# Patient Record
Sex: FEMALE | Race: OTHER | HISPANIC OR LATINO | URBAN - METROPOLITAN AREA
[De-identification: names, ages, dates, MRNs, and addresses within clinical notes are randomized per-mention and may not be internally consistent; named-entity substitution may affect disease eponyms.]

---

## 2017-11-08 ENCOUNTER — APPOINTMENT (RX ONLY)
Dept: URBAN - METROPOLITAN AREA CLINIC 15 | Facility: CLINIC | Age: 47
Setting detail: DERMATOLOGY
End: 2017-11-08

## 2017-11-08 DIAGNOSIS — Z41.9 ENCOUNTER FOR PROCEDURE FOR PURPOSES OTHER THAN REMEDYING HEALTH STATE, UNSPECIFIED: ICD-10-CM

## 2017-11-08 PROCEDURE — ? FILLERS

## 2017-11-08 PROCEDURE — ? BOTOX

## 2017-11-08 PROCEDURE — ? ADDITIONAL NOTES

## 2017-11-08 ASSESSMENT — LOCATION DETAILED DESCRIPTION DERM
LOCATION DETAILED: LEFT LATERAL FOREHEAD
LOCATION DETAILED: LEFT MEDIAL FOREHEAD
LOCATION DETAILED: RIGHT LATERAL MALAR CHEEK
LOCATION DETAILED: RIGHT FOREHEAD
LOCATION DETAILED: LEFT SUPERIOR MEDIAL FOREHEAD
LOCATION DETAILED: LEFT INFERIOR VERMILION BORDER
LOCATION DETAILED: LEFT LATERAL MALAR CHEEK
LOCATION DETAILED: LEFT SUPERIOR CENTRAL MALAR CHEEK
LOCATION DETAILED: LEFT NASOLABIAL FOLD
LOCATION DETAILED: RIGHT LOWER CUTANEOUS LIP
LOCATION DETAILED: LEFT CENTRAL EYEBROW
LOCATION DETAILED: RIGHT INFERIOR LATERAL FOREHEAD
LOCATION DETAILED: LEFT UPPER CUTANEOUS LIP
LOCATION DETAILED: GLABELLA
LOCATION DETAILED: LEFT FOREHEAD
LOCATION DETAILED: LEFT INFERIOR TEMPLE
LOCATION DETAILED: LEFT MID TEMPLE
LOCATION DETAILED: RIGHT UPPER CUTANEOUS LIP
LOCATION DETAILED: RIGHT SUPERIOR CENTRAL MALAR CHEEK
LOCATION DETAILED: RIGHT SUPERIOR FOREHEAD
LOCATION DETAILED: LEFT SUPERIOR LATERAL FOREHEAD
LOCATION DETAILED: RIGHT LATERAL FOREHEAD
LOCATION DETAILED: RIGHT INFERIOR MEDIAL MALAR CHEEK
LOCATION DETAILED: RIGHT ORAL COMMISSURE
LOCATION DETAILED: RIGHT INFERIOR FOREHEAD
LOCATION DETAILED: RIGHT INFERIOR TEMPLE
LOCATION DETAILED: RIGHT SUPERIOR LATERAL MALAR CHEEK
LOCATION DETAILED: LEFT INFERIOR LATERAL FOREHEAD
LOCATION DETAILED: LEFT ORAL COMMISSURE

## 2017-11-08 ASSESSMENT — LOCATION SIMPLE DESCRIPTION DERM
LOCATION SIMPLE: LEFT LOWER LIP
LOCATION SIMPLE: RIGHT FOREHEAD
LOCATION SIMPLE: GLABELLA
LOCATION SIMPLE: LEFT LIP
LOCATION SIMPLE: LEFT CHEEK
LOCATION SIMPLE: LEFT EYEBROW
LOCATION SIMPLE: RIGHT LIP
LOCATION SIMPLE: LEFT FOREHEAD
LOCATION SIMPLE: RIGHT TEMPLE
LOCATION SIMPLE: LEFT TEMPLE
LOCATION SIMPLE: RIGHT CHEEK

## 2017-11-08 ASSESSMENT — LOCATION ZONE DERM
LOCATION ZONE: FACE
LOCATION ZONE: LIP

## 2017-11-08 NOTE — HPI: COSMETIC (BOTOX)
Have You Had Botox Before?: has had botox
Additional History: 3 areas $943
When Was Your Last Botox Treatment?: 2015

## 2017-11-08 NOTE — HPI: COSMETIC (FILLERS)
Have You Had Fillers Before?: has had fillers
Additional History: 1 voluma $1000\\n1 juvederm ultra $600
When Was Your Last Filler Injection?: 2015

## 2017-11-08 NOTE — PROCEDURE: BOTOX
Post-Care Instructions: Patient instructed to not lie down for 4 hours and limit physical activity for 24 hours. Patient instructed not to travel by airplane for 48 hours.
Additional Area 1 Location: chin and corner of mouth
Additional Area 1 Units: 0
Forehead Units: [address] [facility]
Dilution (U/0.1 Cc): 2.5
Detail Level: Simple
Periorbital Skin Units: 21
Consent: Written consent obtained. Risks include but not limited to lid/brow ptosis, bruising, swelling, diplopia, temporary effect, incomplete chemical denervation.
Glabellar Complex Units: 10
Expiration Date (Month Year): 4/20
Lot #: [id number]

## 2017-11-08 NOTE — PROCEDURE: FILLERS
Expiration Date (Month Year): 2018/09/19
Nasolabial Folds Filler Volume In Cc: 0
Lot #: [id number]
Lot #: [id number]
Anesthesia Volume In Cc: 0.5
Expiration Date (Month Year): 2019/01/22
Map Statment: See Attached Map for Complete Details
Filler: Juvederm Voluma XC
Nasolabial Folds Filler Volume In Cc: 1
Lot #: [id number]
Detail Level: Simple
Filler: Juvederm Ultra
Lot #: [id number]
Consent: Written consent obtained. Risks include but not limited to bruising, beading, irregular texture, ulceration, infection, allergic reaction, scar formation, incomplete augmentation, temporary nature, procedural pain.
Additional Area 1 Location: corners of mouth
Expiration Date (Month Year): 2018/11/23
Use Map Statement For Sites (Optional): No
Additional Area 1 Location: corners of the mouth and lips
Post-Care Instructions: Patient instructed to apply ice to reduce swelling.
Additional Anesthesia Volume In Cc: 6

## 2019-04-16 ENCOUNTER — APPOINTMENT (RX ONLY)
Dept: URBAN - METROPOLITAN AREA CLINIC 15 | Facility: CLINIC | Age: 49
Setting detail: DERMATOLOGY
End: 2019-04-16

## 2019-04-16 DIAGNOSIS — Z41.9 ENCOUNTER FOR PROCEDURE FOR PURPOSES OTHER THAN REMEDYING HEALTH STATE, UNSPECIFIED: ICD-10-CM

## 2019-04-16 PROCEDURE — ? BOTOX

## 2019-04-16 PROCEDURE — ? ADDITIONAL NOTES

## 2019-04-16 PROCEDURE — ? JUVEDERM ULTRA INJECTION

## 2019-04-16 PROCEDURE — ? COSMETIC CONSULTATION: FILLERS

## 2019-04-16 ASSESSMENT — LOCATION DETAILED DESCRIPTION DERM
LOCATION DETAILED: RIGHT MEDIAL BUCCAL CHEEK
LOCATION DETAILED: RIGHT NASOLABIAL FOLD
LOCATION DETAILED: LEFT INFERIOR VERMILION LIP
LOCATION DETAILED: LEFT MEDIAL BUCCAL CHEEK
LOCATION DETAILED: LEFT CENTRAL BUCCAL CHEEK
LOCATION DETAILED: RIGHT INFERIOR FOREHEAD
LOCATION DETAILED: LEFT INFERIOR LATERAL FOREHEAD
LOCATION DETAILED: LEFT UPPER CUTANEOUS LIP
LOCATION DETAILED: LEFT INFERIOR TEMPLE
LOCATION DETAILED: LEFT SUPERIOR FOREHEAD
LOCATION DETAILED: RIGHT LATERAL EYEBROW
LOCATION DETAILED: RIGHT INFERIOR CENTRAL MALAR CHEEK
LOCATION DETAILED: GLABELLA
LOCATION DETAILED: RIGHT INFERIOR VERMILION LIP
LOCATION DETAILED: RIGHT SUPERIOR LATERAL MALAR CHEEK
LOCATION DETAILED: SUPERIOR MID FOREHEAD
LOCATION DETAILED: LEFT INFERIOR MEDIAL FOREHEAD
LOCATION DETAILED: LEFT MID TEMPLE
LOCATION DETAILED: RIGHT INFERIOR TEMPLE
LOCATION DETAILED: RIGHT INFERIOR LATERAL FOREHEAD
LOCATION DETAILED: RIGHT UPPER CUTANEOUS LIP
LOCATION DETAILED: RIGHT FOREHEAD
LOCATION DETAILED: LEFT FOREHEAD
LOCATION DETAILED: RIGHT SUPERIOR FOREHEAD
LOCATION DETAILED: LEFT SUPERIOR CENTRAL MALAR CHEEK
LOCATION DETAILED: INFERIOR MID FOREHEAD
LOCATION DETAILED: RIGHT SUPERIOR CENTRAL MALAR CHEEK
LOCATION DETAILED: RIGHT SUPERIOR MEDIAL BUCCAL CHEEK
LOCATION DETAILED: RIGHT CENTRAL BUCCAL CHEEK
LOCATION DETAILED: LEFT CENTRAL MALAR CHEEK
LOCATION DETAILED: RIGHT LATERAL BUCCAL CHEEK
LOCATION DETAILED: NASAL DORSUM

## 2019-04-16 ASSESSMENT — LOCATION SIMPLE DESCRIPTION DERM
LOCATION SIMPLE: RIGHT EYEBROW
LOCATION SIMPLE: LEFT FOREHEAD
LOCATION SIMPLE: LEFT TEMPLE
LOCATION SIMPLE: RIGHT CHEEK
LOCATION SIMPLE: INFERIOR FOREHEAD
LOCATION SIMPLE: RIGHT TEMPLE
LOCATION SIMPLE: SUPERIOR FOREHEAD
LOCATION SIMPLE: GLABELLA
LOCATION SIMPLE: RIGHT LIP
LOCATION SIMPLE: LEFT LIP
LOCATION SIMPLE: LEFT CHEEK
LOCATION SIMPLE: NOSE
LOCATION SIMPLE: RIGHT CHEEK
LOCATION SIMPLE: LEFT CHEEK
LOCATION SIMPLE: RIGHT FOREHEAD

## 2019-04-16 ASSESSMENT — LOCATION ZONE DERM
LOCATION ZONE: FACE
LOCATION ZONE: LIP
LOCATION ZONE: FACE
LOCATION ZONE: NOSE

## 2019-04-16 NOTE — PROCEDURE: ADDITIONAL NOTES
Additional Notes: 3 Areas of Botox $680
Detail Level: Simple
Additional Notes: [employer] Ultra 1 syringe $600
Detail Level: Zone

## 2019-04-16 NOTE — PROCEDURE: BOTOX
Lot #: [id number]
Lateral Platysmal Bands Units: 0
Detail Level: Zone
Expiration Date (Month Year): 08/2021
Glabellar Complex Units: 15
Post-Care Instructions: Patient instructed to not lie down for 4 hours and limit physical activity for 24 hours. Patient instructed not to travel by airplane for 48 hours.
Forehead Units: [address] [facility]
Additional Area 1 Location: Bunny lines
Additional Area 1 Units: 7.5
Consent: Written consent obtained. Risks include but not limited to lid/brow ptosis, bruising, swelling, diplopia, temporary effect, incomplete chemical denervation.
Dilution (U/0.1 Cc): 4
Additional Area 4 Location: nasal tip lifting injection :2.5 Units.

## 2019-04-16 NOTE — PROCEDURE: JUVEDERM ULTRA INJECTION
Cheeks Filler Volume In Cc: 0
Include Cannula Information In Note?: No
Additional Area 2 Location: [place]
Post-Care Instructions: Patient instructed to apply ice to reduce swelling.
Topical Anesthesia?: BLT cream (benzocaine 20%, lidocaine 6%, tetracaine 4%)
Expiration Date (Month Year): 2020-02-26
Map Statment: See [address] for Complete Details
Nasolabial Folds Filler Volume In Cc: 0.7
Filler: Juvederm Ultra
Detail Level: Simple
Additional Area 1 Location: oral commissure
Use Map Statement For Sites (Optional): Yes
Consent: Written consent obtained. Risks include but not limited to bruising, beading, irregular texture, ulceration, infection, allergic reaction, scar formation, incomplete augmentation, temporary nature, procedural pain.
Procedural Text: The filler was administered to the treatment areas noted above.
Lot #: [id number]
Marionette Lines Filler  Volume In Cc: 0.3

## 2019-12-26 ENCOUNTER — APPOINTMENT (RX ONLY)
Dept: URBAN - METROPOLITAN AREA CLINIC 15 | Facility: CLINIC | Age: 49
Setting detail: DERMATOLOGY
End: 2019-12-26

## 2019-12-26 DIAGNOSIS — Z41.9 ENCOUNTER FOR PROCEDURE FOR PURPOSES OTHER THAN REMEDYING HEALTH STATE, UNSPECIFIED: ICD-10-CM

## 2019-12-26 PROCEDURE — ? BOTOX

## 2019-12-26 PROCEDURE — ? ADDITIONAL NOTES

## 2019-12-26 PROCEDURE — ? JUVEDERM ULTRA INJECTION

## 2019-12-26 PROCEDURE — ? RESTYLANE LYFT INJECTION

## 2019-12-26 NOTE — PROCEDURE: BOTOX
Show Right And Left Brow Units: No
Show Depressor Anguli Units: Yes
Right Periorbital Units: 0
Additional Area 1 Location: upper lip
Dilution (U/0.1 Cc): 1
Forehead Units: 10
Additional Area 3 Location: chin
Consent: Written consent obtained. Risks include but not limited to lid/brow ptosis, bruising, swelling, diplopia, temporary effect, incomplete chemical denervation.
Glabellar Complex Units: [address] [facility]
Detail Level: Detailed
Additional Area 2 Location: bunny lines
Lot #: [id number]
Expiration Date (Month Year): 06/2022
Post-Care Instructions: Patient instructed to not lie down for 4 hours and limit physical activity for 24 hours. Patient instructed not to travel by airplane for 48 hours.
Periorbital Skin Units: [address] [facility]

## 2019-12-26 NOTE — PROCEDURE: RESTYLANE LYFT INJECTION
Temple Hollows Filler  Volume In Cc: 0
Number Of Syringes (Required For Inventory): 2
Lot #: [id number] [facility]
Anesthesia Volume In Cc: 0.5
Map Statement: See [address] for Complete Details
Additional Area 2 Location: nose
Procedural Text: The filler was administered to the treatment areas noted above.
Consent: Written consent obtained. Risks include but not limited to bruising, beading, irregular texture, ulceration, infection, allergic reaction, scar formation, incomplete augmentation, temporary nature, procedural pain.
Include Cannula Information In Note?: No
Detail Level: Detailed
Expiration Date (Month Year): 2022-03-31
Filler: Jeremy Bergeron
Post-Care Instructions: Patient instructed to apply ice to reduce swelling.
Anesthesia Type: 1% lidocaine with epinephrine
Additional Anesthesia Volume In Cc: 6

## 2019-12-26 NOTE — PROCEDURE: ADDITIONAL NOTES
Detail Level: Simple
Additional Notes: 2 Restylane Lyft $700 each\\n1 juvederm Ultra $600
Additional Notes: Botox 3 areas $680

## 2019-12-26 NOTE — PROCEDURE: JUVEDERM ULTRA INJECTION
Dorsal Hands Filler  Volume In Cc: 0
Filler: Juvederm Ultra
Post-Care Instructions: Patient instructed to apply ice to reduce swelling.
Expiration Date (Month Year): 2020-08-07
Nasolabial Folds Filler Volume In Cc: 1
Include Cannula Length?: 1.5 inch
Detail Level: Detailed
Use Map Statement For Sites (Optional): No
Additional Area 1 Location: lips
Additional Anesthesia Volume In Cc: 6
Lot #: [id number]
Anesthesia Volume In Cc: 0.5
Procedural Text: The filler was administered to the treatment areas noted above.
Consent: Written consent obtained. Risks include but not limited to bruising, beading, irregular texture, ulceration, infection, allergic reaction, scar formation, incomplete augmentation, temporary nature, procedural pain.
Map Statment: See [address] for Complete Details
Include Cannula Information In Note?: Yes
Include Cannula Size?: 25G

## 2020-01-09 ENCOUNTER — APPOINTMENT (RX ONLY)
Dept: URBAN - METROPOLITAN AREA CLINIC 15 | Facility: CLINIC | Age: 50
Setting detail: DERMATOLOGY
End: 2020-01-09

## 2020-01-09 DIAGNOSIS — Z41.9 ENCOUNTER FOR PROCEDURE FOR PURPOSES OTHER THAN REMEDYING HEALTH STATE, UNSPECIFIED: ICD-10-CM

## 2020-01-09 PROCEDURE — ? ADDITIONAL NOTES

## 2020-01-09 PROCEDURE — ? COSMETIC FOLLOW-UP

## 2020-01-09 NOTE — PROCEDURE: ADDITIONAL NOTES
Detail Level: Simple
Additional Notes: We discussed with patient threads $400 each thread at least 4

## 2021-09-10 ENCOUNTER — APPOINTMENT (RX ONLY)
Dept: URBAN - METROPOLITAN AREA CLINIC 15 | Facility: CLINIC | Age: 51
Setting detail: DERMATOLOGY
End: 2021-09-10

## 2021-09-10 DIAGNOSIS — Z41.9 ENCOUNTER FOR PROCEDURE FOR PURPOSES OTHER THAN REMEDYING HEALTH STATE, UNSPECIFIED: ICD-10-CM

## 2021-09-10 PROCEDURE — ? COSMETIC CONSULTATION: FILLERS

## 2021-09-10 PROCEDURE — ? TEOSYAL RHA 2 INJECTION

## 2021-09-10 PROCEDURE — ? MEDICAL CONSULTATION: DYNAMIC RHYTIDES

## 2021-09-10 PROCEDURE — ? RESTYLANE INJECTION

## 2021-09-10 PROCEDURE — ? RESTYLANE LYFT INJECTION

## 2021-09-10 PROCEDURE — ? JUVEDERM ULTRA PLUS INJECTION

## 2021-09-10 ASSESSMENT — LOCATION DETAILED DESCRIPTION DERM
LOCATION DETAILED: RIGHT SUPERIOR MEDIAL MALAR CHEEK
LOCATION DETAILED: RIGHT CENTRAL MALAR CHEEK
LOCATION DETAILED: LEFT CENTRAL MALAR CHEEK
LOCATION DETAILED: LEFT UPPER CUTANEOUS LIP
LOCATION DETAILED: RIGHT INFERIOR MEDIAL MALAR CHEEK
LOCATION DETAILED: RIGHT MID TEMPLE
LOCATION DETAILED: LEFT LATERAL INFERIOR EYELID
LOCATION DETAILED: LEFT MID TEMPLE

## 2021-09-10 ASSESSMENT — LOCATION SIMPLE DESCRIPTION DERM
LOCATION SIMPLE: RIGHT TEMPLE
LOCATION SIMPLE: RIGHT CHEEK
LOCATION SIMPLE: LEFT INFERIOR EYELID
LOCATION SIMPLE: LEFT TEMPLE
LOCATION SIMPLE: LEFT LIP
LOCATION SIMPLE: LEFT CHEEK

## 2021-09-10 ASSESSMENT — LOCATION ZONE DERM
LOCATION ZONE: LIP
LOCATION ZONE: FACE
LOCATION ZONE: EYELID

## 2021-09-10 NOTE — PROCEDURE: RESTYLANE INJECTION
[place]erm[place] Lips Filler Volume In Cc: 0
Additional Anesthesia Volume In Cc: 6
Additional Area 1 Location: chin
Procedural Text: The filler was administered to the treatment areas noted above.
Price (Use Numbers Only, No Special Characters Or $): 1800
Filler: Restylane Contour
Map Statement: See [address] for Complete Details
Anesthesia Volume In Cc: 0.5
Lot #: [id number]
Include Cannula Length?: 1.5 inch
Detail Level: Detailed
Use Map Statement For Sites (Optional): No
Post-Care Instructions: Patient instructed to apply ice to reduce swelling.
Anesthesia Type: 1% lidocaine with epinephrine
Cheeks Filler Volume In Cc: 2
Expiration Date (Month Year): 2021-11-30
Consent: Written consent obtained. Risks include but not limited to bruising, beading, irregular texture, ulceration, infection, allergic reaction, scar formation, incomplete augmentation, temporary nature, procedural pain.
Include Cannula Size?: 25G

## 2021-09-10 NOTE — PROCEDURE: RESTYLANE LYFT INJECTION
Include Cannula Size?: 25G
Dorsal Hands Filler  Volume In Cc: 0
Expiration Date (Month Year): 2023-02-28
Consent: Written consent obtained. Risks include but not limited to bruising, beading, irregular texture, ulceration, infection, allergic reaction, scar formation, incomplete augmentation, temporary nature, procedural pain.
Additional Area 3 Location: jawline
Include Cannula Information In Note?: Yes
Additional Area 1 Location: medial cheeks
Temple Hollows Filler  Volume In Cc: 1
Post-Care Instructions: Patient instructed to apply ice to reduce swelling.
Price (Use Numbers Only, No Special Characters Or $): [address] [employer]
Include Cannula Length?: 1.5 inch
Map Statement: See [address] for Complete Details
Include Cannula Brand?: DermaSculpt
Additional Area 2 Location: nose
Lot #: [id number]
Filler: Jeremy Bergeron
Procedural Text: The filler was administered to the treatment areas noted above. Used BD syringes.
Detail Level: Zone
Use Map Statement For Sites (Optional): No

## 2021-09-10 NOTE — PROCEDURE: TEOSYAL RHA 2 INJECTION
Post-Care Instructions: Patient instructed to apply ice to reduce swelling.
Temple Hollows Filler  Volume In Cc: 0
Tear Troughs Filler  Volume In Cc: 1
Filler: Teosyal RHA 2
Include Cannula Information In Note?: No
Expiration Date (Month Year): 1/5/24
Additional Anesthesia Volume In Cc: 6
Map Statment: See [address] for Complete Details
Anesthesia Volume In Cc: 0.5
Consent: Written consent obtained. Risks include but not limited to bruising, beading, irregular texture, ulceration, infection, allergic reaction, scar formation, incomplete augmentation, temporary nature, procedural pain.
Detail Level: Detailed
Price (Use Numbers Only, No Special Characters Or $): [employer]20 [employer]
Lot #: [id number]
Anesthesia Type: 1% lidocaine with epinephrine
Procedural Text: The filler was diluted with .3 of lidocaine, then administered to the treatment areas noted above.
Additional Area 1 Location: tear troughs

## 2021-09-10 NOTE — PROCEDURE: JUVEDERM ULTRA PLUS INJECTION
Consent: Written consent obtained. Risks include but not limited to bruising, beading, irregular texture, ulceration, infection, allergic reaction, scar formation, incomplete augmentation, temporary nature, procedural pain.
Include Cannula Information In Note?: No
Map Statment: See [address] for Complete Details
Jawline Filler Volume In Cc: 0
Detail Level: Detailed
Include Cannula Size?: 25G
Filler: Juvederm Ultra Plus
Anesthesia Type: 1% lidocaine with epinephrine
Post-Care Instructions: Patient instructed to apply ice to reduce swelling.
Nasolabial Folds Filler Volume In Cc: 1
Expiration Date (Month Year): 2022-6-19
Additional Anesthesia Volume In Cc: 6
Additional Area 1 Location: chin
Include Cannula Length?: 1.5 inch
Anesthesia Volume In Cc: 0.5
Price (Use Numbers Only, No Special Characters Or $): Darshan 354
Lot #: [id number]
Procedural Text: The filler was administered to the treatment areas noted above.

## 2021-09-24 ENCOUNTER — APPOINTMENT (RX ONLY)
Dept: URBAN - METROPOLITAN AREA CLINIC 15 | Facility: CLINIC | Age: 51
Setting detail: DERMATOLOGY
End: 2021-09-24

## 2021-09-24 DIAGNOSIS — Z41.9 ENCOUNTER FOR PROCEDURE FOR PURPOSES OTHER THAN REMEDYING HEALTH STATE, UNSPECIFIED: ICD-10-CM

## 2021-09-24 PROCEDURE — ? BOTOX

## 2021-09-24 NOTE — PROCEDURE: BOTOX
L Brow Units: 0
Consent: Written consent obtained. Risks include but not limited to lid/brow ptosis, bruising, swelling, diplopia, temporary effect, incomplete chemical denervation.
Show Topical Anesthesia: Yes
Periorbital Skin Units: 9
Expiration Date (Month Year): 1/24
Detail Level: Zone
Lot #: [id number]
Dilution (U/0.1 Cc): 0.5
Post-Care Instructions: Patient instructed to not lie down for 4 hours and limit physical activity for 24 hours. Patient instructed not to travel by airplane for 48 hours.
Forehead Units: 4
Show Right And Left Brow Units: No